# Patient Record
Sex: FEMALE | Race: WHITE | Employment: UNEMPLOYED | URBAN - METROPOLITAN AREA
[De-identification: names, ages, dates, MRNs, and addresses within clinical notes are randomized per-mention and may not be internally consistent; named-entity substitution may affect disease eponyms.]

---

## 2022-11-27 ENCOUNTER — HOSPITAL ENCOUNTER (OUTPATIENT)
Age: 27
Setting detail: OBSERVATION
Discharge: HOME | End: 2022-11-27
Attending: OBSTETRICS & GYNECOLOGY | Admitting: OBSTETRICS & GYNECOLOGY
Payer: COMMERCIAL

## 2022-11-27 VITALS
DIASTOLIC BLOOD PRESSURE: 60 MMHG | TEMPERATURE: 99 F | HEART RATE: 88 BPM | SYSTOLIC BLOOD PRESSURE: 111 MMHG | RESPIRATION RATE: 16 BRPM

## 2022-11-27 PROBLEM — Z3A.21 21 WEEKS GESTATION OF PREGNANCY: Status: ACTIVE | Noted: 2022-11-27

## 2022-11-27 PROCEDURE — 99211 OFF/OP EST MAY X REQ PHY/QHP: CPT

## 2022-11-27 PROCEDURE — 99219 PR INITIAL OBSERVATION CARE/DAY 50 MINUTES: CPT | Performed by: ADVANCED PRACTICE MIDWIFE

## 2022-11-27 NOTE — PROGRESS NOTES
presents to unit with c/o ride side pain worse with cough. Denies vaginal bleeding, loss of fluid and contractions. EDC: 23 Patient reports no complications with current pregnancy. Spot check done - 128. Call light within reach.

## 2022-11-27 NOTE — H&P
Department of Obstetrics and Gynecology  Midwife Obstetrics History and Physical  Admission H and P / Observation Initial Evaluation        CHIEF COMPLAINT:  Lower right sided pain and a cough since thursday    HISTORY OF PRESENT ILLNESS:  Isabel Doshi is a 32 y.o. female , No LMP recorded. Patient is pregnant. ,  at 21w4d. Presents to L&D with  Hx fever of 100.2 on Friday night now gone. + cough. Took Covid test, was negative. 2. Coughing since Thursday, and right sided pain getting worse since then especially with coughing. 3. Positive fetal movement. 4. No Leaking of fluid, no vaginal bleeding, no UTI symptoms, no vaginitis. Prenatals not available. Getting prenatal care in [place], here visiting wife's family for [address]. OB History          3    Para   0    Term                AB   1    Living             SAB   1    IAB        Ectopic        Molar        Multiple        Live Births                    Estimated Due Date: determined by: Pt stated      Pregnancy complicated by:   Patient Active Problem List   Diagnosis Code    21 weeks gestation of pregnancy Z3A.21     PAST OB HISTORY  OB History          3    Para   0    Term                AB   1    Living             SAB   1    IAB        Ectopic        Molar        Multiple        Live Births                    Past Medical History:    History reviewed. No pertinent past medical history. Specifically no history of asthma. Past Surgical History:    History reviewed. No pertinent surgical history. Specifically no history of anesthesia reactions or problems. No history of bleeding or trouble healing / recovering from surgery. Social History:    TOBACCO:   reports that she has never smoked. She has never used smokeless tobacco.  ETOH:   reports that she does not currently use alcohol. DRUGS:   reports no history of drug use. Family History:   History reviewed.  No pertinent family coughing    DW DR Claudy Breaux: OK for discharge. Review warning signs and when to call, as well as meds safe in pregnancy for URI.            Electronically signed by ARIANNA Pineda CNM on 11/27/2022 at 2:09 PM

## 2022-11-27 NOTE — PROGRESS NOTES
Patient given discharge instructions, verbalizes understanding, all questions answered. Denies vaginal bleeding, leaking of fluids, or any problems. Discharged ambulatory.